# Patient Record
Sex: MALE | Race: BLACK OR AFRICAN AMERICAN | NOT HISPANIC OR LATINO | URBAN - METROPOLITAN AREA
[De-identification: names, ages, dates, MRNs, and addresses within clinical notes are randomized per-mention and may not be internally consistent; named-entity substitution may affect disease eponyms.]

---

## 2015-12-16 RX ORDER — LISINOPRIL 2.5 MG/1
12 TABLET ORAL
Qty: 0 | Refills: 0 | COMMUNITY
Start: 2015-12-16

## 2017-11-19 ENCOUNTER — INPATIENT (INPATIENT)
Facility: HOSPITAL | Age: 77
LOS: 0 days | Discharge: LEFT BEFORE DISCHARGE | DRG: 313 | End: 2017-11-20
Attending: STUDENT IN AN ORGANIZED HEALTH CARE EDUCATION/TRAINING PROGRAM | Admitting: STUDENT IN AN ORGANIZED HEALTH CARE EDUCATION/TRAINING PROGRAM
Payer: MEDICARE

## 2017-11-19 VITALS
HEART RATE: 117 BPM | DIASTOLIC BLOOD PRESSURE: 68 MMHG | HEIGHT: 69 IN | TEMPERATURE: 98 F | WEIGHT: 212.08 LBS | SYSTOLIC BLOOD PRESSURE: 102 MMHG | OXYGEN SATURATION: 100 % | RESPIRATION RATE: 22 BRPM

## 2017-11-19 DIAGNOSIS — R07.9 CHEST PAIN, UNSPECIFIED: ICD-10-CM

## 2017-11-19 LAB
ALBUMIN SERPL ELPH-MCNC: 4.1 G/DL — SIGNIFICANT CHANGE UP (ref 3.5–5)
ALP SERPL-CCNC: 115 U/L — SIGNIFICANT CHANGE UP (ref 40–120)
ALT FLD-CCNC: 55 U/L DA — SIGNIFICANT CHANGE UP (ref 10–60)
ANION GAP SERPL CALC-SCNC: 10 MMOL/L — SIGNIFICANT CHANGE UP (ref 5–17)
APTT BLD: 84.5 SEC — HIGH (ref 27.5–37.4)
AST SERPL-CCNC: 42 U/L — HIGH (ref 10–40)
BASOPHILS # BLD AUTO: 0.2 K/UL — SIGNIFICANT CHANGE UP (ref 0–0.2)
BASOPHILS NFR BLD AUTO: 1.3 % — SIGNIFICANT CHANGE UP (ref 0–2)
BILIRUB SERPL-MCNC: 0.7 MG/DL — SIGNIFICANT CHANGE UP (ref 0.2–1.2)
BUN SERPL-MCNC: 22 MG/DL — HIGH (ref 7–18)
CALCIUM SERPL-MCNC: 8.8 MG/DL — SIGNIFICANT CHANGE UP (ref 8.4–10.5)
CHLORIDE SERPL-SCNC: 111 MMOL/L — HIGH (ref 96–108)
CK MB BLD-MCNC: 2.4 % — SIGNIFICANT CHANGE UP (ref 0–3.5)
CK MB CFR SERPL CALC: 4.2 NG/ML — HIGH (ref 0–3.6)
CK SERPL-CCNC: 172 U/L — SIGNIFICANT CHANGE UP (ref 35–232)
CO2 SERPL-SCNC: 19 MMOL/L — LOW (ref 22–31)
CREAT SERPL-MCNC: 2.12 MG/DL — HIGH (ref 0.5–1.3)
EOSINOPHIL # BLD AUTO: 0.5 K/UL — SIGNIFICANT CHANGE UP (ref 0–0.5)
EOSINOPHIL NFR BLD AUTO: 4.2 % — SIGNIFICANT CHANGE UP (ref 0–6)
GLUCOSE SERPL-MCNC: 136 MG/DL — HIGH (ref 70–99)
HCT VFR BLD CALC: 47.4 % — SIGNIFICANT CHANGE UP (ref 39–50)
HGB BLD-MCNC: 15.1 G/DL — SIGNIFICANT CHANGE UP (ref 13–17)
INR BLD: 11.9 RATIO — CRITICAL HIGH (ref 0.88–1.16)
LYMPHOCYTES # BLD AUTO: 2.8 K/UL — SIGNIFICANT CHANGE UP (ref 1–3.3)
LYMPHOCYTES # BLD AUTO: 21.9 % — SIGNIFICANT CHANGE UP (ref 13–44)
MCHC RBC-ENTMCNC: 31.3 PG — SIGNIFICANT CHANGE UP (ref 27–34)
MCHC RBC-ENTMCNC: 31.9 GM/DL — LOW (ref 32–36)
MCV RBC AUTO: 98.1 FL — SIGNIFICANT CHANGE UP (ref 80–100)
MONOCYTES # BLD AUTO: 1.2 K/UL — HIGH (ref 0–0.9)
MONOCYTES NFR BLD AUTO: 9.3 % — SIGNIFICANT CHANGE UP (ref 2–14)
NEUTROPHILS # BLD AUTO: 8 K/UL — HIGH (ref 1.8–7.4)
NEUTROPHILS NFR BLD AUTO: 63.4 % — SIGNIFICANT CHANGE UP (ref 43–77)
NT-PROBNP SERPL-SCNC: 2899 PG/ML — HIGH (ref 0–450)
PLATELET # BLD AUTO: 284 K/UL — SIGNIFICANT CHANGE UP (ref 150–400)
POTASSIUM SERPL-MCNC: 4.8 MMOL/L — SIGNIFICANT CHANGE UP (ref 3.5–5.3)
POTASSIUM SERPL-SCNC: 4.8 MMOL/L — SIGNIFICANT CHANGE UP (ref 3.5–5.3)
PROT SERPL-MCNC: 8.2 G/DL — SIGNIFICANT CHANGE UP (ref 6–8.3)
PROTHROM AB SERPL-ACNC: 128.1 SEC — HIGH (ref 9.8–12.7)
RBC # BLD: 4.84 M/UL — SIGNIFICANT CHANGE UP (ref 4.2–5.8)
RBC # FLD: 12.8 % — SIGNIFICANT CHANGE UP (ref 10.3–14.5)
SODIUM SERPL-SCNC: 140 MMOL/L — SIGNIFICANT CHANGE UP (ref 135–145)
TROPONIN I SERPL-MCNC: 0.06 NG/ML — HIGH (ref 0–0.04)
WBC # BLD: 12.6 K/UL — HIGH (ref 3.8–10.5)
WBC # FLD AUTO: 12.6 K/UL — HIGH (ref 3.8–10.5)

## 2017-11-19 PROCEDURE — 71010: CPT | Mod: 26

## 2017-11-19 PROCEDURE — 99285 EMERGENCY DEPT VISIT HI MDM: CPT | Mod: 25

## 2017-11-19 RX ORDER — PHYTONADIONE (VIT K1) 5 MG
2.5 TABLET ORAL ONCE
Qty: 0 | Refills: 0 | Status: COMPLETED | OUTPATIENT
Start: 2017-11-19 | End: 2017-11-20

## 2017-11-19 RX ORDER — ATORVASTATIN CALCIUM 80 MG/1
40 TABLET, FILM COATED ORAL AT BEDTIME
Qty: 0 | Refills: 0 | Status: DISCONTINUED | OUTPATIENT
Start: 2017-11-19 | End: 2017-11-20

## 2017-11-19 RX ORDER — SODIUM CHLORIDE 9 MG/ML
3 INJECTION INTRAMUSCULAR; INTRAVENOUS; SUBCUTANEOUS ONCE
Qty: 0 | Refills: 0 | Status: COMPLETED | OUTPATIENT
Start: 2017-11-19 | End: 2017-11-19

## 2017-11-19 RX ORDER — ALBUTEROL 90 UG/1
2.5 AEROSOL, METERED ORAL
Qty: 0 | Refills: 0 | Status: COMPLETED | OUTPATIENT
Start: 2017-11-19 | End: 2017-11-19

## 2017-11-19 RX ADMIN — ALBUTEROL 2.5 MILLIGRAM(S): 90 AEROSOL, METERED ORAL at 21:50

## 2017-11-19 RX ADMIN — SODIUM CHLORIDE 3 MILLILITER(S): 9 INJECTION INTRAMUSCULAR; INTRAVENOUS; SUBCUTANEOUS at 20:58

## 2017-11-19 RX ADMIN — ALBUTEROL 2.5 MILLIGRAM(S): 90 AEROSOL, METERED ORAL at 21:40

## 2017-11-19 RX ADMIN — ALBUTEROL 2.5 MILLIGRAM(S): 90 AEROSOL, METERED ORAL at 22:10

## 2017-11-19 NOTE — ED ADULT NURSE NOTE - OBJECTIVE STATEMENT
As per pt, "I went to [facility] today and they told me everything was ok. But I still have left sided chest pain."

## 2017-11-19 NOTE — ED PROVIDER NOTE - MEDICAL DECISION MAKING DETAILS
78 y/o M w/ PMHx of CAD and asthma, here w/ chest pain and possible elevated INR, with no obvious signs of bleeding on exam. Will check chest x-ray, EKG, labs and anticipate admission.

## 2017-11-19 NOTE — ED ADULT NURSE NOTE - PMH
Asthma, unspecified asthma severity, uncomplicated    CHF (congestive heart failure)  diabet  Diabetes    Essential hypertension    MI, old    Nephrolithiasis    Pacemaker

## 2017-11-19 NOTE — ED PROVIDER NOTE - OBJECTIVE STATEMENT
78 y/o M w/ PMHx of type 2 DM, CHF, w/ permanent pacemaker/AICD, HTN, asthma presents to ED c/o intermittent chest pain and SOB x 2 days. Pt was seen at cardiology clinic at [facility] 2 days ago, was told everything was "ok" but was called back to the ER and was sent home twice. Initially, he was told that INR was high, but given no recommendations. Now, pt presents w/ continued chest pain and SOB. Denies any cough, fever, recent leg swelling or any other complaints. NKDA. Pt is on Coumadin. 76 y/o M w/ PMHx of type 2 DM, CHF, w/ permanent pacemaker/AICD, HTN, asthma presents to ED c/o intermittent chest pain and SOB x 2 days. Pt was seen at cardiology clinic at [facility] 2 days ago, was told everything was "ok" but was called back to the ER and was disharged twice today. Initially, he was told that INR was high, but given no recommendations. Now, pt presents w/ continued chest pain and SOB. Denies any cough, fever, recent leg swelling or any other complaints. NKDA. Pt is on Coumadin.

## 2017-11-20 VITALS — WEIGHT: 96.12 LBS

## 2017-11-20 DIAGNOSIS — R07.9 CHEST PAIN, UNSPECIFIED: ICD-10-CM

## 2017-11-20 DIAGNOSIS — E11.9 TYPE 2 DIABETES MELLITUS WITHOUT COMPLICATIONS: ICD-10-CM

## 2017-11-20 DIAGNOSIS — N17.9 ACUTE KIDNEY FAILURE, UNSPECIFIED: ICD-10-CM

## 2017-11-20 DIAGNOSIS — Z90.2 ACQUIRED ABSENCE OF LUNG [PART OF]: Chronic | ICD-10-CM
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
## 2017-11-20 DIAGNOSIS — I10 ESSENTIAL (PRIMARY) HYPERTENSION: ICD-10-CM

## 2017-11-20 DIAGNOSIS — R79.1 ABNORMAL COAGULATION PROFILE: ICD-10-CM

## 2017-11-20 DIAGNOSIS — Z98.890 OTHER SPECIFIED POSTPROCEDURAL STATES: Chronic | ICD-10-CM

## 2017-11-20 DIAGNOSIS — Z29.9 ENCOUNTER FOR PROPHYLACTIC MEASURES, UNSPECIFIED: ICD-10-CM

## 2017-11-20 DIAGNOSIS — I50.9 HEART FAILURE, UNSPECIFIED: ICD-10-CM

## 2017-11-20 LAB
ANION GAP SERPL CALC-SCNC: 10 MMOL/L — SIGNIFICANT CHANGE UP (ref 5–17)
BUN SERPL-MCNC: 23 MG/DL — HIGH (ref 7–18)
CALCIUM SERPL-MCNC: 9 MG/DL — SIGNIFICANT CHANGE UP (ref 8.4–10.5)
CHLORIDE SERPL-SCNC: 112 MMOL/L — HIGH (ref 96–108)
CHOLEST SERPL-MCNC: 128 MG/DL — SIGNIFICANT CHANGE UP (ref 10–199)
CK MB BLD-MCNC: 2.7 % — SIGNIFICANT CHANGE UP (ref 0–3.5)
CK MB CFR SERPL CALC: 4.4 NG/ML — HIGH (ref 0–3.6)
CK SERPL-CCNC: 164 U/L — SIGNIFICANT CHANGE UP (ref 35–232)
CO2 SERPL-SCNC: 19 MMOL/L — LOW (ref 22–31)
CREAT SERPL-MCNC: 1.88 MG/DL — HIGH (ref 0.5–1.3)
GLUCOSE BLDC GLUCOMTR-MCNC: 96 MG/DL — SIGNIFICANT CHANGE UP (ref 70–99)
GLUCOSE SERPL-MCNC: 97 MG/DL — SIGNIFICANT CHANGE UP (ref 70–99)
HBA1C BLD-MCNC: 6.8 % — HIGH (ref 4–5.6)
HCT VFR BLD CALC: 45.3 % — SIGNIFICANT CHANGE UP (ref 39–50)
HDLC SERPL-MCNC: 31 MG/DL — LOW (ref 40–125)
HGB BLD-MCNC: 14.4 G/DL — SIGNIFICANT CHANGE UP (ref 13–17)
INR BLD: 6.39 RATIO — CRITICAL HIGH (ref 0.88–1.16)
LIPID PNL WITH DIRECT LDL SERPL: 56 MG/DL — SIGNIFICANT CHANGE UP
MAGNESIUM SERPL-MCNC: 2 MG/DL — SIGNIFICANT CHANGE UP (ref 1.6–2.6)
MCHC RBC-ENTMCNC: 31.2 PG — SIGNIFICANT CHANGE UP (ref 27–34)
MCHC RBC-ENTMCNC: 31.7 GM/DL — LOW (ref 32–36)
MCV RBC AUTO: 98.3 FL — SIGNIFICANT CHANGE UP (ref 80–100)
PHOSPHATE SERPL-MCNC: 2.5 MG/DL — SIGNIFICANT CHANGE UP (ref 2.5–4.5)
PLATELET # BLD AUTO: 235 K/UL — SIGNIFICANT CHANGE UP (ref 150–400)
POTASSIUM SERPL-MCNC: 4.3 MMOL/L — SIGNIFICANT CHANGE UP (ref 3.5–5.3)
POTASSIUM SERPL-SCNC: 4.3 MMOL/L — SIGNIFICANT CHANGE UP (ref 3.5–5.3)
PROTHROM AB SERPL-ACNC: 72.3 SEC — HIGH (ref 9.8–12.7)
RBC # BLD: 4.61 M/UL — SIGNIFICANT CHANGE UP (ref 4.2–5.8)
RBC # FLD: 12.8 % — SIGNIFICANT CHANGE UP (ref 10.3–14.5)
SODIUM SERPL-SCNC: 141 MMOL/L — SIGNIFICANT CHANGE UP (ref 135–145)
TOTAL CHOLESTEROL/HDL RATIO MEASUREMENT: 4.1 RATIO — SIGNIFICANT CHANGE UP (ref 3.4–9.6)
TRIGL SERPL-MCNC: 207 MG/DL — HIGH (ref 10–149)
TROPONIN I SERPL-MCNC: 0.07 NG/ML — HIGH (ref 0–0.04)
TSH SERPL-MCNC: 1.31 UU/ML — SIGNIFICANT CHANGE UP (ref 0.34–4.82)
VIT B12 SERPL-MCNC: 947 PG/ML — HIGH (ref 243–894)
WBC # BLD: 10.8 K/UL — HIGH (ref 3.8–10.5)
WBC # FLD AUTO: 10.8 K/UL — HIGH (ref 3.8–10.5)

## 2017-11-20 PROCEDURE — 99223 1ST HOSP IP/OBS HIGH 75: CPT | Mod: AI,GC

## 2017-11-20 RX ORDER — INSULIN GLARGINE 100 [IU]/ML
26 INJECTION, SOLUTION SUBCUTANEOUS AT BEDTIME
Qty: 0 | Refills: 0 | Status: DISCONTINUED | OUTPATIENT
Start: 2017-11-20 | End: 2017-11-20

## 2017-11-20 RX ORDER — IPRATROPIUM BROMIDE 0.2 MG/ML
2 SOLUTION, NON-ORAL INHALATION
Qty: 0 | Refills: 0 | Status: DISCONTINUED | OUTPATIENT
Start: 2017-11-20 | End: 2017-11-20

## 2017-11-20 RX ORDER — SPIRONOLACTONE 25 MG/1
1 TABLET, FILM COATED ORAL
Qty: 0 | Refills: 0 | COMMUNITY
Start: 2017-11-20

## 2017-11-20 RX ORDER — ALBUTEROL 90 UG/1
2 AEROSOL, METERED ORAL
Qty: 0 | Refills: 0 | COMMUNITY
Start: 2017-11-20

## 2017-11-20 RX ORDER — LISINOPRIL 2.5 MG/1
1 TABLET ORAL
Qty: 0 | Refills: 0 | COMMUNITY
Start: 2017-11-20

## 2017-11-20 RX ORDER — INSULIN LISPRO 100/ML
VIAL (ML) SUBCUTANEOUS
Qty: 0 | Refills: 0 | Status: DISCONTINUED | OUTPATIENT
Start: 2017-11-20 | End: 2017-11-20

## 2017-11-20 RX ORDER — METOPROLOL TARTRATE 50 MG
25 TABLET ORAL
Qty: 0 | Refills: 0 | Status: DISCONTINUED | OUTPATIENT
Start: 2017-11-20 | End: 2017-11-20

## 2017-11-20 RX ORDER — IPRATROPIUM BROMIDE 0.2 MG/ML
2 SOLUTION, NON-ORAL INHALATION
Qty: 0 | Refills: 0 | COMMUNITY
Start: 2017-11-20

## 2017-11-20 RX ORDER — METOPROLOL TARTRATE 50 MG
1 TABLET ORAL
Qty: 0 | Refills: 0 | COMMUNITY
Start: 2017-11-20

## 2017-11-20 RX ORDER — ALBUTEROL 90 UG/1
2 AEROSOL, METERED ORAL
Qty: 0 | Refills: 0 | COMMUNITY

## 2017-11-20 RX ORDER — PHYTONADIONE (VIT K1) 5 MG
2.5 TABLET ORAL ONCE
Qty: 0 | Refills: 0 | Status: COMPLETED | OUTPATIENT
Start: 2017-11-20 | End: 2017-11-20

## 2017-11-20 RX ORDER — FUROSEMIDE 40 MG
40 TABLET ORAL DAILY
Qty: 0 | Refills: 0 | Status: DISCONTINUED | OUTPATIENT
Start: 2017-11-20 | End: 2017-11-20

## 2017-11-20 RX ORDER — SPIRONOLACTONE 25 MG/1
25 TABLET, FILM COATED ORAL DAILY
Qty: 0 | Refills: 0 | Status: DISCONTINUED | OUTPATIENT
Start: 2017-11-20 | End: 2017-11-20

## 2017-11-20 RX ORDER — INSULIN GLARGINE 100 [IU]/ML
0 INJECTION, SOLUTION SUBCUTANEOUS
Qty: 0 | Refills: 0 | COMMUNITY
Start: 2017-11-20

## 2017-11-20 RX ORDER — ALBUTEROL 90 UG/1
2 AEROSOL, METERED ORAL EVERY 6 HOURS
Qty: 0 | Refills: 0 | Status: DISCONTINUED | OUTPATIENT
Start: 2017-11-20 | End: 2017-11-20

## 2017-11-20 RX ORDER — ATORVASTATIN CALCIUM 80 MG/1
1 TABLET, FILM COATED ORAL
Qty: 0 | Refills: 0 | COMMUNITY
Start: 2017-11-20

## 2017-11-20 RX ORDER — IPRATROPIUM/ALBUTEROL SULFATE 18-103MCG
3 AEROSOL WITH ADAPTER (GRAM) INHALATION EVERY 6 HOURS
Qty: 0 | Refills: 0 | Status: DISCONTINUED | OUTPATIENT
Start: 2017-11-20 | End: 2017-11-20

## 2017-11-20 RX ORDER — LISINOPRIL 2.5 MG/1
5 TABLET ORAL DAILY
Qty: 0 | Refills: 0 | Status: DISCONTINUED | OUTPATIENT
Start: 2017-11-20 | End: 2017-11-20

## 2017-11-20 RX ORDER — SPIRONOLACTONE 25 MG/1
1 TABLET, FILM COATED ORAL
Qty: 0 | Refills: 0 | COMMUNITY

## 2017-11-20 RX ORDER — ASPIRIN/CALCIUM CARB/MAGNESIUM 324 MG
81 TABLET ORAL DAILY
Qty: 0 | Refills: 0 | Status: DISCONTINUED | OUTPATIENT
Start: 2017-11-20 | End: 2017-11-20

## 2017-11-20 RX ORDER — IPRATROPIUM BROMIDE 0.2 MG/ML
2 SOLUTION, NON-ORAL INHALATION
Qty: 0 | Refills: 0 | COMMUNITY

## 2017-11-20 RX ORDER — FUROSEMIDE 40 MG
1 TABLET ORAL
Qty: 0 | Refills: 0 | COMMUNITY
Start: 2017-11-20

## 2017-11-20 RX ORDER — INSULIN GLARGINE 100 [IU]/ML
26 INJECTION, SOLUTION SUBCUTANEOUS
Qty: 0 | Refills: 0 | COMMUNITY

## 2017-11-20 RX ORDER — WARFARIN SODIUM 2.5 MG/1
1 TABLET ORAL
Qty: 0 | Refills: 0 | COMMUNITY

## 2017-11-20 RX ORDER — METOPROLOL TARTRATE 50 MG
12.5 TABLET ORAL
Qty: 0 | Refills: 0 | Status: DISCONTINUED | OUTPATIENT
Start: 2017-11-20 | End: 2017-11-20

## 2017-11-20 RX ORDER — ASPIRIN/CALCIUM CARB/MAGNESIUM 324 MG
1 TABLET ORAL
Qty: 0 | Refills: 0 | COMMUNITY
Start: 2017-11-20

## 2017-11-20 RX ADMIN — Medication 12.5 MILLIGRAM(S): at 05:16

## 2017-11-20 RX ADMIN — ALBUTEROL 2 PUFF(S): 90 AEROSOL, METERED ORAL at 14:23

## 2017-11-20 RX ADMIN — Medication 10 MILLIGRAM(S): at 12:17

## 2017-11-20 RX ADMIN — Medication 2.5 MILLIGRAM(S): at 00:07

## 2017-11-20 RX ADMIN — Medication 40 MILLIGRAM(S): at 12:16

## 2017-11-20 RX ADMIN — Medication 81 MILLIGRAM(S): at 12:18

## 2017-11-20 RX ADMIN — LISINOPRIL 5 MILLIGRAM(S): 2.5 TABLET ORAL at 12:16

## 2017-11-20 RX ADMIN — Medication 3 MILLILITER(S): at 14:24

## 2017-11-20 RX ADMIN — Medication 1: at 12:14

## 2017-11-20 RX ADMIN — Medication 2 PUFF(S): at 12:14
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
## 2017-11-20 NOTE — DISCHARGE NOTE ADULT - PATIENT PORTAL LINK FT
“You can access the Follow[employer] Patient Portal, offered by [facility], by registering with the following website: [email or web address]”

## 2017-11-20 NOTE — H&P ADULT - HISTORY OF PRESENT ILLNESS
77 years old male from home lives with children, walks independently with PMH of IDDM(on lantus only), HFrEF(25% in 2015), Afib on coumadin, s/p permanent pacemaker/AICD([employer], last check up 2 months ago with battery change), s/p MI, s/p Rt spontaneous pneumothorax s/p surgery, HTN, CKD, Asthma(never intubated and not on home oxygen), and Nephrolithiasis s/p open Rt kidney surgery presented to ED c/o left sided chest pain with SOB worsening over the last 2 days. Pt was seen at cardiology clinic at [facility] 2 days ago, was told everything was fine, blood test including INR was done on Friday morning and he was sent to home. He took coumadin at Friday night which was the last dose, and got called back from the cardiologist in Saturday morning that his INR is 13 requires close f/u on Monday. He was very upset after listening about the news, and developed chest pain and SOB so visited [facility] this morning for further work-up, however left hospital because he did not like their management. On the way home he developed another episode of chest pain and SOB so decided to come to [facility]. He is able to walk 0.25blocks before getting SOB usually, and has chronic intermittent chest pain and SOB frequently, sometimes multiple times in a day. He states that if he becomes emotionally agitated, he develops these symptoms. This time his chest pain was throbbing, pressure-like, started on the left side but later radiated to the right side, lasted about 1-2 hours and was accompanied by SOB and palpitation. ROS otherwise negative.    In ED, pt was initially tachycardic to 117 with borderline BP, later VS improved. EKG paced rhythm without significant change from the last visit. Labs significant for INR 11.9, BUN/Cr 22/2.12, mild leukocytosis of 12.6K without left shift. Cardiac enz x2 mildly elevated to 0.063-->0.065, but he had elevated cardiac enzymes 2 years ago which was about the same level. Patient is admitted to the telemetry floor for r/o ACS given multiple risk factors, and elevated INR requires monitoring.     *Primary team please call [facility] pharmacy(in the system), and reconcile medications as patient does not remember his whole medication list, and gives me very strange dose of medications; he says he takes lisinopril 120mg BID and lasix 25mg once daily, and "is sure about this dose". Med rec was done based on prior record.

## 2017-11-20 NOTE — H&P ADULT - PROBLEM SELECTOR PLAN 6
-Check Hba1c and lipid profile  -Continue home dose of lantus 26U  -Humalog sliding scale, consistent carb diet  -Monitor accucheck

## 2017-11-20 NOTE — DISCHARGE NOTE ADULT - PLAN OF CARE
Remain symptom free You left against medical advise.  However, an echocardiogram was recommended. Maintain normal INR Follow up w/ your Cardiologist or PCP to monitor your INR Resolution Follow up w/ your PCP to monitor your kidney function

## 2017-11-20 NOTE — H&P ADULT - NSHPPHYSICALEXAM_GEN_ALL_CORE
PHYSICAL EXAM:  GENERAL: NAD, well-groomed, well-developed  HEAD:  Atraumatic, Normocephalic  EYES: EOMI, PERRLA, conjunctiva and sclera clear  ENMT: No tonsillar erythema, exudates, or enlargement; Moist mucous membranes  NECK: Supple, No JVD, Normal thyroid  NERVOUS SYSTEM:  Alert & Oriented X3, Motor Strength 5/5 B/L upper and lower extremities  CHEST/LUNG: Clear to percussion bilaterally; No rales, rhonchi, wheezing, or rubs  HEART: Regular rate and rhythm; distant heart sound. No murmurs, rubs, or gallops  ABDOMEN: Soft, Nontender, Nondistended; Bowel sounds present, left flank old surgical scar  EXTREMITIES:  2+ Peripheral Pulses bilaterally, No clubbing, cyanosis, or edema  LYMPH: No lymphadenopathy noted  SKIN: No rashes or lesions    T(C): 36.9 (11-20-17 @ 01:03), Max: 36.9 (11-20-17 @ 01:03)  HR: 85 (11-20-17 @ 01:03) (85 - 117)  BP: 123/70 (11-20-17 @ 01:03) (102/68 - 123/70)  RR: 20 (11-20-17 @ 01:03) (20 - 22)  SpO2: 97% (11-20-17 @ 01:03) (97% - 100%)  Wt(kg): --  I&O's Summary

## 2017-11-20 NOTE — DISCHARGE NOTE ADULT - HOSPITAL COURSE
77 years old male from home lives with children, walks independently with PMH of IDDM(on lantus only), HFrEF(25% in 2015), Afib on coumadin, s/p permanent pacemaker/AICD([employer], last check up 2 months ago with battery change), s/p MI, s/p Rt spontaneous pneumothorax s/p surgery, HTN, CKD, Asthma(never intubated and not on home oxygen), and Nephrolithiasis s/p open Rt kidney surgery presented to ED c/o left sided chest pain with SOB worsening over the last 2 days.      Chest Pain, Rule Out Acute Myocardial Infarction  MANDI score 5, 26% risk at 14 days of: all-cause mortality, new or recurrent MI, or severe recurrent ischemia requiring urgent revascularization.  Started Lipitor, Metoprolol, and Aspirn   Monitored on telemetry   Ordered echocardiogram but left against medical advice  Troponin remained elevated but left against medical advice  [employer] called for interrogation     Prolonged INR  Most likely from Coumadin toxicity.  On admission INR=11.9.  Held Coumadin.  Initiated reversal w/ 5mg PO vitamin K.  Last INR=6.39  No signs of active bleeding and patient denies having hematochezia/melena/change in urine color  Maintained fall precautions    NAI (Acute Kidney Injury)  Possibly d/t diuretics and Lisinopril  Ordered urine electrolytes  Monitored kidney function    Essential Hypertension   Blood pressure stable  Continued Metoprolol     CHF (Congestive Heart Failure)  Monitored on telemetry  Monitored daily weight  Maintained strict intake and output   Continued Metoprolol and Atorvastatin    Diabetes  [id number]=4.8  Continued Lantus  Monitored finger sticks and treated w/ sliding scale as needed    Need for Prophylactic Measure    IMPROVE=2, however held chemical DVT prophylaxis due to prolonged INR

## 2017-11-20 NOTE — CONSULT NOTE ADULT - ASSESSMENT
76 yo M with noted PMH including HFrEF s/p ICD, and ? LV thrombus presenting with supertherapeutic INR.     1. Supertherapeutic INR:   -Uncertain if patient has an actual LV thrombus, or if there was concern for developing one in setting of low EF. Someone wrote "atrial fibrillation" in chart, however unknown if patient actually has AF and that is the indication for the warfarin   -Please have ICD interrogated ([employer]) to see if there is documented A fib  -Please check echocardiogram to evaluate for LV thrombus.   -Primary team has tried obtaining prior records, has been unable thus far  -Would continue warfarin for INR goal 2-3 in case of either A fib or LV thrombus    2. HFrEF: Continue patient's home regimen of lisinopril, metoprolol, spironolactone  Patient appears euvolemic on exam, would continue his home regimen of PO furosemide    3. Mildly elevated troponins: Likely due to CKD and do not represent underlying ischemia, particularly in absence of symptoms and since patient is already therapeutic on warfarin

## 2017-11-20 NOTE — H&P ADULT - PROBLEM SELECTOR PLAN 3
-NAI likely from diuretics and lisionpril, will hold home med  -NAI on CKD vs new baseline of CKD. BUN/Cr ratio <20, check urine lytes  -Monitor renal function, avoid nephrotoxins  -Holding IV hydration as patient has poor EF, and clinically euvolemic

## 2017-11-20 NOTE — H&P ADULT - PMH
Asthma, unspecified asthma severity, uncomplicated    Atrial fibrillation    CHF (congestive heart failure)  diabet  Diabetes    Essential hypertension    MI, old    Nephrolithiasis    Pacemaker    Pneumothorax

## 2017-11-20 NOTE — H&P ADULT - ATTENDING COMMENTS
Plans discussed with Dr. Henning. Agree with above except for the below:   Patient is seen and examined in ED. HE is a 77 years old male from home lives with children, walks independently with PMH of I HFrEF(25% in 2015), Afib on coumadin, s/p permanent pacemaker/AICD([employer],  s/p MI, s/p Rt spontaneous pneumothorax s/p surgery, HTN, CKD, Asthma(never intubated and not on home oxygen), and Nephrolithiasis s/p open Rt kidney surgery presented to ED c/o left sided chest pain with SOB worsening over the last 2 days. Pt was seen at cardiology clinic at [facility] 2 days ago, was told everything was fine, blood test including INR was done on Friday morning and he was sent to home. He took coumadin at Friday night which was the last dose, and got called back from the cardiologist in Saturday morning that his INR is 13 requires close f/u on Monday. He was very upset after listening about the news, and developed chest pain and SOB so visited [facility] this morning for further work-up, however left hospital because he did not like their management.   In ED he denied palpitations, SOB, fever, headache, n/v, change in bowel and urinary habits, abdominal pain     PE as above  Vital Signs Last 24 Hrs  T(C): 36.8 (20 Nov 2017 10:09), Max: 36.9 (20 Nov 2017 01:03)  T(F): 98.3 (20 Nov 2017 10:09), Max: 98.4 (20 Nov 2017 01:03)  HR: 68 (20 Nov 2017 10:09) (59 - 117)  BP: 130/60 (20 Nov 2017 10:09) (102/68 - 132/81)  BP(mean): --  RR: 18 (20 Nov 2017 10:09) (18 - 22)  SpO2: 100% (20 Nov 2017 10:09) (96% - 100%)    A/P 76 yo male with extensive PMH presented with chest pain and supra therapeutic INR     1. Chest pain concern for ACS in patient with Hx of MI and HF  Telemetry monitoring   CE x3 done and slight elevation of Troponin likely secondary to underlying HF and CKD   On Aspirin, Statin. Home dose of BB was decreased by resident   Restart metoprolol 25 mg PO BID   Patient reports having a clot around his heart???? HE is not sure why he is on Coumadin. Maybe atrial thrombus   Was not able to get pts medical records from his Cardiologist - Dr. Moore [phone] ( she is not in the office until Wednesday)   Will get TTE   Cardiology consult requested: Dr. Tse   Will call [employer] for AICD interrogation     2. Chronic HFrEF (25%) has AICD and PPM. Was suppose to get his batteries change 2 month ago, but did not follow up with appointment  Restart Furosemide 40 mg PO qd  Spironolactone 25 mg PO qd and Lisinopril at lower dose: 5 mg PO qd   3. Chronic Afib: rate controlled , INR supra therapeutic: INR 13 on admission, improved to 6 s/p Vitamin K 5 mg x2  Monitor INR         4. DM: f/u HA1C  c/w Home dose of Lantus 26 un     5. NAI vs CKD or CKD stage 3  Baseline Cr. 1.7   Monitor Cr  Avoid nephrotoxic drugs   c/w Furosemide and Lisinopril, will adjust the dosed is Cr. worsens   6. DVT prophylaxis: On coumadin with elevated INR     Plan of care discussed with PGY 1- DR. Verma, NP - Yossi and cardiologist - Dr. Tse

## 2017-11-20 NOTE — H&P ADULT - PROBLEM SELECTOR PLAN 1
-MANDI score 5, 26% risk at 14 days of: all-cause mortality, new or recurrent MI, or severe recurrent ischemia requiring urgent revascularization.  -Starting lipitor 40mg and metoprolol at low dose, but not starting aspirn as patient is at high risk of having bleeding from high INR.  -Monitor telemetry and check TTE  -Trend cardiac enz x3 in AM -MANDI score 5, 26% risk at 14 days of: all-cause mortality, new or recurrent MI, or severe recurrent ischemia requiring urgent revascularization.  -Starting lipitor 40mg and metoprolol at low dose, but not starting aspirn as patient is at high risk of having bleeding from high INR.  -Monitor telemetry and check TTE  -Trend cardiac enz x3 in AM  -No need for cardio consult per Dr. Henning, at this moment. -MANDI score 5, 26% risk at 14 days of: all-cause mortality, new or recurrent MI, or severe recurrent ischemia requiring urgent revascularization.  -Starting lipitor 40mg and metoprolol at low dose, and aspirn 81mg.  -Monitor telemetry and check TTE  -Trend cardiac enz x3 in AM  -No need for cardio consult per Dr. Henning, at this moment.

## 2017-11-20 NOTE — H&P ADULT - FAMILY HISTORY
Mother  Still living? Unknown  Diabetes mellitus, Age at diagnosis: Age Unknown  Family history of cancer, Age at diagnosis: Age Unknown

## 2017-11-20 NOTE — DISCHARGE NOTE ADULT - CARE PLAN
Principal Discharge DX:	Chest pain, rule out acute myocardial infarction  Goal:	Remain symptom free  Instructions for follow-up, activity and diet:	You left against medical advise.  However, an echocardiogram was recommended.  Secondary Diagnosis:	Prolonged INR  Goal:	Maintain normal INR  Instructions for follow-up, activity and diet:	Follow up w/ your Cardiologist or PCP to monitor your INR  Secondary Diagnosis:	NAI (acute kidney injury)  Goal:	Resolution  Instructions for follow-up, activity and diet:	Follow up w/ your PCP to monitor your kidney function  Secondary Diagnosis:	CHF (congestive heart failure)  Secondary Diagnosis:	Diabetes

## 2017-11-20 NOTE — H&P ADULT - ASSESSMENT
Patient is a 77y old  Male who presents with a chief complaint of chest pain and SOB. Patient is admitted to the telemetry floor for r/o ACS given multiple risk factors, and elevated INR requires monitoring.

## 2017-11-20 NOTE — H&P ADULT - NSHPLABSRESULTS_GEN_ALL_CORE
LABS:                        15.1   12.6  )-----------( 284      ( 19 Nov 2017 20:56 )             47.4     11-19    140  |  111<H>  |  22<H>  ----------------------------<  136<H>  4.8   |  19<L>  |  2.12<H>    Ca    8.8      19 Nov 2017 20:56    TPro  8.2  /  Alb  4.1  /  TBili  0.7  /  DBili  x   /  AST  42<H>  /  ALT  55  /  AlkPhos  115  11-19    PT/INR - ( 19 Nov 2017 20:56 )   PT: 128.1 sec;   INR: 11.90 ratio         PTT - ( 19 Nov 2017 20:56 )  PTT:84.5 sec    CAPILLARY BLOOD GLUCOSE      POCT Blood Glucose.: 131 mg/dL (19 Nov 2017 20:35)    LIVER FUNCTIONS - ( 19 Nov 2017 20:56 )  Alb: 4.1 g/dL / Pro: 8.2 g/dL / ALK PHOS: 115 U/L / ALT: 55 U/L DA / AST: 42 U/L / GGT: x             CARDIAC MARKERS ( 20 Nov 2017 02:37 )  0.065 ng/mL / x     / 164 U/L / x     / 4.4 ng/mL  CARDIAC MARKERS ( 19 Nov 2017 20:56 )  0.063 ng/mL / x     / 172 U/L / x     / 4.2 ng/mL

## 2017-11-20 NOTE — DISCHARGE NOTE ADULT - CARE PROVIDER_API CALL
Andrés Henning), [facility]  [id number] [address] [place] [address]  Phone: [phone]  Fax: [phone]

## 2017-11-20 NOTE — H&P ADULT - PROBLEM SELECTOR PLAN 2
-Likely from coumadin toxicity, s/p 5mg PO vitamin K in ED  -No signs of active bleeding and patient denies having hematochezia/melena/change in urine color  -Will monitor INR, fall precaution  -Hold coumadin for now

## 2017-11-20 NOTE — H&P ADULT - PROBLEM SELECTOR PLAN 7
-IMPROVE score 2, however holding chemical DVT prophylaxis due to prolonged INR. Monitor INR daily and reassess.

## 2017-11-20 NOTE — H&P ADULT - PROBLEM SELECTOR PLAN 5
-Hold home med lisinopril, spironolactone, and lasix for now, patient euvolemic and has NAI  -Monitor telemetry  -Daily weight, strict I/O  -Continue metoprolol and statin

## 2017-11-20 NOTE — ED ADULT NURSE REASSESSMENT NOTE - NS ED NURSE REASSESS COMMENT FT1
PATIENT DID NOT WANT TO STAY EXPLAINED TO PATIENT HE HAS A BED ON 5TH FLOOR.   PATIENT DID NOT CARE , BEGAN YELLING AND WANTED TO GO HOME.   CHARLIE ARBOLEDA AND SHAHBAZ ALBRECHT MADE AWARE OF SAME.   PATIENT LEFT ED HOLDING

## 2017-12-19 PROCEDURE — 83880 ASSAY OF NATRIURETIC PEPTIDE: CPT

## 2017-12-19 PROCEDURE — 84443 ASSAY THYROID STIM HORMONE: CPT

## 2017-12-19 PROCEDURE — 71045 X-RAY EXAM CHEST 1 VIEW: CPT

## 2017-12-19 PROCEDURE — 93005 ELECTROCARDIOGRAM TRACING: CPT

## 2017-12-19 PROCEDURE — 94640 AIRWAY INHALATION TREATMENT: CPT

## 2017-12-19 PROCEDURE — 84100 ASSAY OF PHOSPHORUS: CPT

## 2017-12-19 PROCEDURE — 85027 COMPLETE CBC AUTOMATED: CPT

## 2017-12-19 PROCEDURE — 80048 BASIC METABOLIC PNL TOTAL CA: CPT

## 2017-12-19 PROCEDURE — 85610 PROTHROMBIN TIME: CPT

## 2017-12-19 PROCEDURE — 84484 ASSAY OF TROPONIN QUANT: CPT

## 2017-12-19 PROCEDURE — 83036 HEMOGLOBIN GLYCOSYLATED A1C: CPT

## 2017-12-19 PROCEDURE — 83735 ASSAY OF MAGNESIUM: CPT

## 2017-12-19 PROCEDURE — 80053 COMPREHEN METABOLIC PANEL: CPT

## 2017-12-19 PROCEDURE — 80061 LIPID PANEL: CPT

## 2017-12-19 PROCEDURE — 99285 EMERGENCY DEPT VISIT HI MDM: CPT | Mod: 25

## 2017-12-19 PROCEDURE — 82553 CREATINE MB FRACTION: CPT

## 2017-12-19 PROCEDURE — 82607 VITAMIN B-12: CPT

## 2017-12-19 PROCEDURE — 82962 GLUCOSE BLOOD TEST: CPT

## 2017-12-19 PROCEDURE — 82550 ASSAY OF CK (CPK): CPT

## 2017-12-19 PROCEDURE — 85730 THROMBOPLASTIN TIME PARTIAL: CPT

## 2018-12-08 NOTE — H&P ADULT - PROBLEM SELECTOR PLAN 4
Normal vision: sees adequately in most situations; can see medication labels, newsprint
Hypokalemia
-BP now stable, will continue metoprolol with parameters  -Hold home med lisionpril, spironolactone, and lasix as Cr is getting worse  -Monitor BP

## 2020-01-30 NOTE — DISCHARGE NOTE ADULT - MEDICATION SUMMARY - MEDICATIONS TO TAKE
I will START or STAY ON the medications listed below when I get home from the hospital:    spironolactone 25 mg oral tablet  -- 1 tab(s) by mouth once a day  -- Indication: For Essential hypertension    aspirin 81 mg oral tablet, chewable  -- 1 tab(s) by mouth once a day  -- Indication: For Need for prophylactic measure    lisinopril 5 mg oral tablet  -- 1 tab(s) by mouth once a day  -- Indication: For Essential hypertension    PARoxetine 10 mg oral tablet  -- 1 tab(s) by mouth once a day  -- Indication: For Need for prophylactic measure    insulin glargine  -- Indication: For Diabetes    insulin glargine  -- Indication: For Diabetes    atorvastatin 40 mg oral tablet  -- 1 tab(s) by mouth once a day (at bedtime)  -- Indication: For Chest pain    metoprolol tartrate 25 mg oral tablet  -- 1 tab(s) by mouth 2 times a day  -- Indication: For Essential hypertension    albuterol 90 mcg/inh inhalation aerosol  -- 2 puff(s) inhaled every 6 hours  -- Indication: For Asthma    ipratropium CFC free 17 mcg/inh inhalation aerosol  -- 2 puff(s) inhaled 4 times a day  -- Indication: For Asthma    furosemide 40 mg oral tablet  -- 1 tab(s) by mouth once a day  -- Indication: For CHF (congestive heart failure) Rotation Flap Text: The defect edges were debeveled with a #15 scalpel blade.  Given the location of the defect, shape of the defect and the proximity to free margins a rotation flap was deemed most appropriate.  Using a sterile surgical marker, an appropriate rotation flap was drawn incorporating the defect and placing the expected incisions within the relaxed skin tension lines where possible.    The area thus outlined was incised deep to adipose tissue with a #15 scalpel blade.  The skin margins were undermined to an appropriate distance in all directions utilizing iris scissors.

## 2021-05-24 NOTE — ED PROVIDER NOTE - CARDIAC PEDAL EDEMA
1+ pitting Mirvaso Pregnancy And Lactation Text: This medication has not been assigned a Pregnancy Risk Category. It is unknown if the medication is excreted in breast milk.

## 2024-04-23 NOTE — H&P ADULT - NSCORESITESY/N_GEN_A_CORE_RD
Yes Pt presents to ED c/o chest pain when lying down and right thigh shooting pain x 2 days. Pt is 3.5 week post partum and was told to come in by her MD to r/o blood clots. Pt also reports she had to have a blood transfusion 5 days post partum. no other pmh.